# Patient Record
Sex: MALE | Race: OTHER
[De-identification: names, ages, dates, MRNs, and addresses within clinical notes are randomized per-mention and may not be internally consistent; named-entity substitution may affect disease eponyms.]

---

## 2019-06-07 ENCOUNTER — HOSPITAL ENCOUNTER (EMERGENCY)
Dept: HOSPITAL 72 - EMR | Age: 19
Discharge: HOME | End: 2019-06-07
Payer: MEDICAID

## 2019-06-07 VITALS — SYSTOLIC BLOOD PRESSURE: 115 MMHG | DIASTOLIC BLOOD PRESSURE: 71 MMHG

## 2019-06-07 VITALS — DIASTOLIC BLOOD PRESSURE: 71 MMHG | SYSTOLIC BLOOD PRESSURE: 115 MMHG

## 2019-06-07 VITALS — HEIGHT: 63 IN | BODY MASS INDEX: 24.8 KG/M2 | WEIGHT: 140 LBS

## 2019-06-07 DIAGNOSIS — S01.81XD: Primary | ICD-10-CM

## 2019-06-07 DIAGNOSIS — Z48.02: ICD-10-CM
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
## 2019-06-07 PROCEDURE — 99282 EMERGENCY DEPT VISIT SF MDM: CPT

## 2019-06-07 NOTE — EMERGENCY ROOM REPORT
History of Present Illness


General


Chief Complaint:  Wound Recheck/Suture Removal


Source:  Medical Record





Present Illness


HPI


17 YO Male presents to the ED c/o 3 sutures that need to be removed s/p wound 

closure to the left side of the forehead last week. pt. UTD with vaccinations. 

denies pain, bleeding, discharge, erythema or warmth.  Patient denies 

tenderness.  Patient denies any other symptoms or aggravating factors at this 

time.


Allergies:  


Coded Allergies:  


     No Known Allergies (Unverified , 5/30/19)





Patient History


Past Medical History:  see triage record


Past Surgical History:  none


Pertinent Family History:  none


Immunizations:  UTD


Reviewed Nursing Documentation:  PMH: Agreed; PSxH: Agreed





Nursing Documentation-PMH


Past Medical History:  No History, Except For





Review of Systems


All Other Systems:  negative except mentioned in HPI





Physical Exam





Vital Signs








  Date Time  Temp Pulse Resp B/P (MAP) Pulse Ox O2 Delivery O2 Flow Rate FiO2


 


6/7/19 15:48 98.1 66 16 115/71 (86) 99 Room Air  








Sp02 EP Interpretation:  reviewed, normal


General Appearance:  no apparent distress, alert, GCS 15, non-toxic


Head:  normocephalic, other - left side of the forehead laceration healed with 

3 sutures in place.


Eyes:  bilateral eye normal inspection, bilateral eye PERRL


ENT:  hearing grossly normal, normal voice


Neck:  full range of motion


Respiratory:  lungs clear, normal breath sounds, speaking full sentences


Cardiovascular #1:  regular rate, rhythm


Musculoskeletal:  gait/station normal, normal range of motion, non-tender


Neurologic:  alert, oriented x3, responsive, motor strength/tone normal, 

sensory intact, speech normal, grossly normal


Psychiatric:  judgement/insight normal


Skin:  normal color, no rash, warm/dry, well hydrated, wd healing/no infection 

noted - left side of the forehead laceration healed with 3 sutures in place.





Medical Decision Making


PA Attestation


Dr. Barry is my supervising Physician whom patient management has been 

discussed with.


Diagnostic Impression:  


 Primary Impression:  


 Encounter for removal of sutures


ER Course


Pt. presents to the ED c/o 3 sutures that need to be removed s/p wound closure 

to the left side of the forehead last week. pt. UTD with vaccinations. denies 

pain, bleeding, discharge, erythema or warmth.  Patient denies tenderness.  

Patient denies any other symptoms or aggravating factors at this time.





Ddx considered but are not limited to laceration, tendon injury, cellulitis,

dehiscence. 





Vital signs: are WNL, pt. is afebrile


H&PE are most consistent with:  healed laceration of the left side of the 

forehead





ORDERS: none required at this time, the diagnosis is clinical





ED INTERVENTIONS: 


-  3 Sutures  removed. 





DISCHARGE: At this time pt. is stable for d/c to home. Will provide printed 

patient care instructions, and any necessary prescriptions. Care plan and 

follow up instructions have been discussed with the patient prior to discharge.





Last Vital Signs








  Date Time  Temp Pulse Resp B/P (MAP) Pulse Ox O2 Delivery O2 Flow Rate FiO2


 


6/7/19 15:58 98.1 65 16 115/71 99 Room Air  








Disposition:  HOME, SELF-CARE


Condition:  Stable


Scripts


Bacitracin/Polymyxin B Sulfate (BACITRACIN-POLYMYXIN OINTMENT) 28.35 Gm 

Oint...g.


1 APPLIC TP BID, #28.3 GM


   Prov: Loly Molina         6/7/19


Referrals:  


[facility]ERR[facility] (PCP)


Patient Instructions:  Suture Removal, Care After





Additional Instructions:  


Take any previously prescribed medications as directed. 





 ** Follow up with a Primary Care Provider in 3-5 days, even if your symptoms 

have resolved. ** 


--Please review list of primary care clinics, if you do not already have a 

primary care provider





Return sooner to ED if new symptoms occur, or current symptoms become worse. 














- Please note that this Emergency Department Report was dictated using [employer] technology software, occasionally this can lead to 

erroneous entry secondary to interpretation by the dictation equipment.











Loly Molina Jun 7, 2019 16:08

## 2019-06-07 NOTE — NUR
ED Nurse Note:



suture removal done by PA, pt cleared to be d/c per ER provider, pt discharge 
and aftercare instruction provided w/ prescription, pt education done via 
discussion and handout, pt left w/ all belongings, wound site intact.
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
## 2019-06-11 ENCOUNTER — HOSPITAL ENCOUNTER (EMERGENCY)
Dept: HOSPITAL 72 - EMR | Age: 19
Discharge: HOME | End: 2019-06-11
Payer: MEDICAID

## 2019-06-11 VITALS — SYSTOLIC BLOOD PRESSURE: 115 MMHG | DIASTOLIC BLOOD PRESSURE: 78 MMHG

## 2019-06-11 VITALS — HEIGHT: 63 IN | BODY MASS INDEX: 24.8 KG/M2 | WEIGHT: 140 LBS

## 2019-06-11 VITALS — DIASTOLIC BLOOD PRESSURE: 70 MMHG | SYSTOLIC BLOOD PRESSURE: 110 MMHG
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
## 2019-06-11 DIAGNOSIS — S42.025D: Primary | ICD-10-CM

## 2019-06-11 PROCEDURE — 99282 EMERGENCY DEPT VISIT SF MDM: CPT

## 2019-06-11 NOTE — NUR
ED Nurse Note:



PTF ROM HOME CAME IN DUE TO LEFT CLAVICLE PAIN. PT WAS SEEN AT [facility] 05/30/19 
AND DX WITH CLAVICLE FRACTURE. ABLE TO ROTATE LEFT ARM COMPLETELY BUT STILL 
FEELS PAIN. NO SWELLING NOTED. PT IS AAO X4, AMBULATORY.

## 2019-06-11 NOTE — NUR
ER DISCHARGE NOTE:



Patient is cleared to be discharged per PA, pt is aox4, on room air, with 
stable vital signs. pt was given dc and prescription instructions, pt was able 
to verbalize understanding, pt id band removed. pt is able to ambulate with 
steady gait. pt took all belongings.

## 2019-06-11 NOTE — EMERGENCY ROOM REPORT
History of Present Illness


General


Chief Complaint:  Pain


Source:  Patient





Present Illness


HPI


18-year-old male with history of left clavicle fracture x11 days here 

complaining of increased pain since the fracture occurred.  Patient reports 

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  

Patient is rating the pain 7 out of 10 without radiation denying tingling and 

numbness, and has been taking ibuprofen for pain as needed.  Denies new injury, 

chest pain, shortness of breath, palpitation, and all other associated symptoms


Allergies:  


Coded Allergies:  


     No Known Allergies (Unverified , 5/30/19)





Patient History


Past Medical History:  see triage record


Past Surgical History:  unable to obtain


Pertinent Family History:  none


Immunizations:  UTD


Reviewed Nursing Documentation:  PMH: Agreed; PSxH: Agreed





Nursing Documentation-PMH


Past Medical History:  No History, Except For





Review of Systems


All Other Systems:  negative except mentioned in HPI





Physical Exam





Vital Signs








  Date Time  Temp Pulse Resp B/P (MAP) Pulse Ox O2 Delivery O2 Flow Rate FiO2


 


6/11/19 17:42 98.1 66 20 110/70 (83) 97 Room Air  








Sp02 EP Interpretation:  reviewed, normal


General Appearance:  normal inspection, well appearing, no apparent distress


Head:  normocephalic, atraumatic


Eyes:  bilateral eye normal inspection, bilateral eye PERRL


ENT:  normal ENT inspection


Neck:  normal inspection, full range of motion, supple


Respiratory:  chest non-tender, lungs clear, no respiratory distress, no 

wheezing


Cardiovascular #1:  normal inspection, regular rate, rhythm, no edema, no murmur


Gastrointestinal:  normal inspection, soft


Genitourinary:  no CVA tenderness


Musculoskeletal:  back normal, digits/nails normal, other - Left clavicle 

tender to palpation


Neurologic:  normal inspection, alert, oriented x3, responsive


Psychiatric:  normal inspection, judgement/insight normal, memory normal


Skin:  normal inspection, normal color, no rash, warm/dry


Lymphatic:  normal inspection, no adenopathy





Procedures


Additional Procedure


Procedure Narrative


Left arm sling applied





Medical Decision Making


PA Attestation


All my diagnosis and treatment plans were reviewed ad discussed with my 

supervising physician Dr. Mane


Diagnostic Impression:  


 Primary Impression:  


 Nondisplaced fracture of shaft of left clavicle, sequela


ER Course


18-year-old male with history of left clavicle fracture x11 days here 

complaining of increased pain since the fracture occurred.  Patient reports 

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  

Patient is rating the pain 7 out of 10 without radiation denying tingling and 

numbness, and has been taking ibuprofen for pain as needed.  Denies new injury, 

chest pain, shortness of breath, palpitation, and all other associated symptoms





Ddx considered but are not limited to: Left clavicle fracture, left clavicle 

sprain, neuropathy secondary to clavicle fracture











Vital signs: are WNL, pt. is afebrile








 H&PE are most consistent with second encounter of left clavicle fracture














ORDERS: Naproxen, Voltaren gel, arm sling, no x-ray necessary at this point as 

it was previously done 11 days ago








ED INTERVENTIONS: None required at this time.























DISCHARGE: At this time pt. is stable for d/c to home. Will provide printed 

patient care instructions, and any necessary prescriptions. Care plan and 

follow up instructions have been discussed with the patient prior to discharge.

  Follow-up with a primary care provider for referral to Ortho keep sling on at 

all times avoid strenuous physical activity I highly advised patient to not go 

back to work for the next couple days and then gave him a note to be excused 

she has numbness in the left arm return to the emergency room immediately





Last Vital Signs








  Date Time  Temp Pulse Resp B/P (MAP) Pulse Ox O2 Delivery O2 Flow Rate FiO2


 


6/11/19 17:42 98.1 66 20 110/70 97 Room Air  








Disposition:  HOME, SELF-CARE


Condition:  Stable


Scripts


Diclofenac Sodium (VOLTAREN) 100 Gm Gel..gram.


2 GM TP TID, #100 GM


   Prov: Rl Parham         6/11/19 


Naproxen* (NAPROXEN*) 500 Mg Tablet


500 MG ORAL TWICE A DAY, #60 TAB


   Prov: Rl Parham         6/11/19


Patient Instructions:  Clavicle Fracture





Additional Instructions:  


Wear your sling at all times to find that you have not been wearing your sling 

and applying more pressure to tendons around your fracture have been pulled 

more than usual follow-up with the primary care provider for further assessment 

he should not be doing strenuous physical activity with a clavicle fracture











Rl Parham Jun 11, 2019 18:00